# Patient Record
(demographics unavailable — no encounter records)

---

## 2024-12-03 NOTE — PHYSICAL EXAM
[Scleral Icterus] : No Scleral Icterus [Hepatojugular Reflux] : patient did not have a sustained hepatojugular reflux [Spider Angioma] : No spider angioma(s) were observed [Abdominal Bruit] : no abdominal bruit [Abdominal  Ascites] : no ascites [Ascites Fluid Wave] : no ascites fluid wave [Ascites Tense] : ascites is not tense [Ascites Shifting Dullness] : no shifting dullness of ascites [Splenomegaly] : no splenomegaly [Caput Medusae] : no caput medusae observed [Liver Palpable ___ Finger Breadths Below Costal Margin] : was not palpable below costal margin [Jaundice] : No jaundice [Palmar Erythema] : no Palmar Erythema [FreeTextEntry7] : normal [General Appearance - Alert] : alert [Sclera] : the sclera and conjunctiva were normal [Outer Ear] : the ears and nose were normal in appearance [Neck Appearance] : the appearance of the neck was normal [Apical Impulse] : the apical impulse was normal [Bowel Sounds] : normal bowel sounds [Abdomen Soft] : soft [Abdomen Tenderness] : non-tender [Abdomen Mass (___ Cm)] : no abdominal mass palpated [No CVA Tenderness] : no ~M costovertebral angle tenderness [No Spinal Tenderness] : no spinal tenderness [Abnormal Walk] : normal gait [Nail Clubbing] : no clubbing  or cyanosis of the fingernails [Musculoskeletal - Swelling] : no joint swelling seen [Motor Tone] : muscle strength and tone were normal [Skin Color & Pigmentation] : normal skin color and pigmentation [Skin Turgor] : normal skin turgor [] : no rash [Deep Tendon Reflexes (DTR)] : deep tendon reflexes were 2+ and symmetric [Sensation] : the sensory exam was normal to light touch and pinprick [No Focal Deficits] : no focal deficits [Oriented To Time, Place, And Person] : oriented to person, place, and time [Impaired Insight] : insight and judgment were intact [Affect] : the affect was normal

## 2024-12-03 NOTE — ASSESSMENT
[FreeTextEntry1] : 37 yo woman with h/o Crohns disease now on biosimilar infliximab.  She had transient abnormalities of the liver enzymes in 04/24 with subsequent normal studies.  No h/o expsure to alc, herbal remedies, infectious liver disease.   she is overweight however Fibroscan exam today shows no steatosis and no real fibrosis. Will repeat liver encymes, screen for sundry liver disease and order sonogram to eval GB polyp seen on previous study Patient has been instructed in low carbohydrate diet for weight loss. return in a month to review findings

## 2024-12-03 NOTE — HISTORY OF PRESENT ILLNESS
[FreeTextEntry1] : 36-year-old female with strIcturing ileocolonic Crohn's Disease (currently on INFLECTRA, previously failed ADA and UST, previously screen failed for HAN, stricture/ileocolic resection after SBO Dr Lyon 7/2023) who presents today for eval of abn liver enzymes.  Patient had minor elevations of ALT AST in 4/24 and had subsequent normal liver enzymes.  Patient is chronically overweight  evere since using steroids for colitis years ago.  Not diabetic.  No h/o alcohol use or any infectious hepatitis exposure. . Remote imaging studies showed normal liver but polyp in GB

## 2024-12-06 NOTE — PHYSICAL EXAM
[No Masses] : no abdominal mass palpated [Abdomen Soft] : soft [Abnormal Walk] : normal gait [No Focal Deficits] : no focal deficits [Normal] : oriented to person, place, and time [Oriented To Time, Place, And Person] : oriented to person, place, and time [Normal Affect] : the affect was normal [Normal Mood] : the mood was normal [Hearing Threshold Finger Rub Not [place]] : hearing was normal [Normal Appearance] : the appearance of the neck was normal [No Neck Mass] : no neck mass was observed [No Respiratory Distress] : no respiratory distress [de-identified] : b/l lower abdominal tenderness [de-identified] : no current rashes

## 2024-12-06 NOTE — HISTORY OF PRESENT ILLNESS
[FreeTextEntry1] : 36-year-old female with strIcturing ileocolonic Crohn's Disease (currently on INFLECTRA, previously failed ADA and UST, previously screen failed for HAN, stricture/ileocolic resection 7/2023) who presents today for routine f/u.   Since she started Inflectra, reports worsening eczema on her hands, arms and legs. Has been following with derm in [place], has tried multiple topicals with some improvement when she is using diligently. Dermatologist advised possible switching to Rinvoq for improved eczema and continued Crohns control.   Tolerating Inflectra q8 weeks. From a GI perspective, she reports worsening lower abdominal pain the past two months. She reports when she is eating poorly she has increased symptoms with headaches, frequent BMs with urgency, and lower abdominal pain. Recently made some dietary changes and her symptoms have improved. Having 2 formed BMs daily without urgency. Denies nocturnal BMs. Denies nausea, vomiting, unintentional weight loss, rectal bleeding, heartburn, dysphagia. Denies joint pain or mouth ulcers. Recently completed course of amoxicillin for root canal procedure.  Recent visit with hepatology who repeated labs and screened for sundry liver disease and order sonogram to eval GB polyp seen on previous study.  12/24 cbc, cmp unremarkable. + VERÓNICA, dsDNA 19, elevated Immunoglobulins.   PRIOR: H. pylori breath test negative.   Pt reports today that she is overall doing much better than previous since her surgery. States she can have anywhere from 0-4 BMs daily but usually 2-3. Describes stool as soft non-painful non-bloody bowel movements per day. Previously complained of esophageal/upper GI discomfort while eating, stating she could feel food and liquid moving through her esophagus while eating and feels headache right away. She gets cramping with certain foods. Celiac (-). Reports abdomen gets stiff and often feels like "intestines are working hard".  Denies joint pain and ulcers.  Had a rash which developed a few months ago and saw DERM who did biopsy which was found to be eczema. Pt curious if rash could be from inflectra. Has hemorrhoid that comes and goes, can be painful when uses the bathroom. Las infusion 4/18/24 and pt reports no issues. Labs taken reveal AST 42 and ALT 65.  After multiple medication failures, patient previously refused IFX initiation to attempt dietary modifications until hospital admission due to SBO in 6/2023, which resulted in resection of terminal ileum stricture/ileocolic resection with Dr. Lyon 7/2023. She was initiated on IFX biosimilar postoperatively and has now completed 6 infusions. She denies side effects from receiving the infusions. She denies extraintestinal manifestations. She states that since surgery, she has had significant improvement in abdominal pain and is able to tolerate most foods without difficulty. Denies nausea and vomiting and bloating.   Cscope (4/11/24): The colon was unremarkable to the level of the ileocolonic anastomosis, minimal ulceration at staple line. NeoTI did not have any inflammation. path: left colon biopsy showing increased chronic inflammatory cells.   EGD (4/11/24): normal esophagus, localized granularity in stomach, normal duodenum. path: Gastric mucosa showing focal mild chronic active inflammation, Helicobacter pylori-like microorganisms present. No intestinal metaplasia or dysplasia identified.  7/5/23 - Laparoscopic Ileocolic Resection with Dr. Lyon Pathology: Ileal and cecal resection: Moderate to severely chronic active ileitis w/ focal ulceration and intramural abscess and moderate to severe colitis at the ICV Acute and chronic serositis and serosal fibrous adhesions Unremarkable appendix 2 hyperplastic region LNs No active disease seen at resection margins Negative for dysplasia

## 2024-12-06 NOTE — HISTORY OF PRESENT ILLNESS
[FreeTextEntry1] : 36-year-old female with strIcturing ileocolonic Crohn's Disease (currently on INFLECTRA, previously failed ADA and UST, previously screen failed for HAN, stricture/ileocolic resection 7/2023) who presents today for routine f/u.   Since she started Inflectra, reports worsening eczema on her hands, arms and legs. Has been following with derm in [place], has tried multiple topicals with some improvement when she is using diligently. Dermatologist advised possible switching to Rinvoq for improved eczema and continued Crohns control.   Tolerating Inflectra q8 weeks. From a GI perspective, she reports worsening lower abdominal pain the past two months. She reports when she is eating poorly she has increased symptoms with headaches, frequent BMs with urgency, and lower abdominal pain. Recently made some dietary changes and her symptoms have improved. Having 2 formed BMs daily without urgency. Denies nocturnal BMs. Denies nausea, vomiting, unintentional weight loss, rectal bleeding, heartburn, dysphagia. Denies joint pain or mouth ulcers. Recently completed course of amoxicillin for root canal procedure.  Recent visit with hepatology who repeated labs and screened for sundry liver disease and order sonogram to eval GB polyp seen on previous study.  12/24 cbc, cmp unremarkable. + VERÓNICA, dsDNA 19, elevated Immunoglobulins.   PRIOR: H. pylori breath test negative.   Pt reports today that she is overall doing much better than previous since her surgery. States she can have anywhere from 0-4 BMs daily but usually 2-3. Describes stool as soft non-painful non-bloody bowel movements per day. Previously complained of esophageal/upper GI discomfort while eating, stating she could feel food and liquid moving through her esophagus while eating and feels headache right away. She gets cramping with certain foods. Celiac (-). Reports abdomen gets stiff and often feels like "intestines are working hard".  Denies joint pain and ulcers.  Had a rash which developed a few months ago and saw DERM who did biopsy which was found to be eczema. Pt curious if rash could be from inflectra. Has hemorrhoid that comes and goes, can be painful when uses the bathroom. Las infusion 4/18/24 and pt reports no issues. Labs taken reveal AST 42 and ALT 65.  After multiple medication failures, patient previously refused IFX initiation to attempt dietary modifications until hospital admission due to SBO in 6/2023, which resulted in resection of terminal ileum stricture/ileocolic resection with Dr. Lyon 7/2023. She was initiated on IFX biosimilar postoperatively and has now completed 6 infusions. She denies side effects from receiving the infusions. She denies extraintestinal manifestations. She states that since surgery, she has had significant improvement in abdominal pain and is able to tolerate most foods without difficulty. Denies nausea and vomiting and bloating.   Cscope (4/11/24): The colon was unremarkable to the level of the ileocolonic anastomosis, minimal ulceration at staple line. NeoTI did not have any inflammation. path: left colon biopsy showing increased chronic inflammatory cells.   EGD (4/11/24): normal esophagus, localized granularity in stomach, normal duodenum. path: Gastric mucosa showing focal mild chronic active inflammation, Helicobacter pylori-like microorganisms present. No intestinal metaplasia or dysplasia identified.  7/5/23 - Laparoscopic Ileocolic Resection with Dr. Lyon Pathology: Ileal and cecal resection: Moderate to severely chronic active ileitis w/ focal ulceration and intramural abscess and moderate to severe colitis at the ICV Acute and chronic serositis and serosal fibrous adhesions Unremarkable appendix 2 hyperplastic region LNs No active disease seen at resection margins Negative for dysplasia

## 2024-12-06 NOTE — PHYSICAL EXAM
Hematology called and stated that they had tried to reach out to patient to schedule for referral. Could not get a hold of her.    Please advise with callback @ [email or web address]    Thank you,  Paco Pollock, PCT     [No Masses] : no abdominal mass palpated [Abdomen Soft] : soft [Abnormal Walk] : normal gait [No Focal Deficits] : no focal deficits [Normal] : oriented to person, place, and time [Oriented To Time, Place, And Person] : oriented to person, place, and time [Normal Affect] : the affect was normal [Normal Mood] : the mood was normal [Hearing Threshold Finger Rub Not [place]] : hearing was normal [Normal Appearance] : the appearance of the neck was normal [No Neck Mass] : no neck mass was observed [No Respiratory Distress] : no respiratory distress [de-identified] : b/l lower abdominal tenderness [de-identified] : no current rashes

## 2024-12-06 NOTE — ASSESSMENT
[FreeTextEntry1] : 36-year-old female with stricturing ileocolonic Crohn's Disease (currently on INFLECTRA, previously failed ADA and UST, previously screen failed for HAN, stricture/ileocolic resection 7/2023) who presents today for follow up.   Stricturing ileocolonic Crohn's disease Patient currently with increased lower abdominal pain over the past two months, pain, HAs, and diarrhea are exacerbated by poor diet. Also with worsening eczema since starting IFX, following with derm who advised switching biologic to Rinvoq. Given increased GI symptoms, worsening eczema, elevated dsDNA (potentially indicative of decreased response) will plan to evaluate further with colonoscopy and change advanced therapies  - 4/24 cscope s/p ileocolic resection (7/5/23) revealed minimal ulceration at staple line but otherwise NeoTI and colon to level of ileocolonic anastamosis without inflammation.  - 12/24 cbc, cmp unremarkable - +VERÓNICA, dsDNA 19, elevated Immunoglobulins (ordered by hep) - plan for cscope at [facility] with miralax/mag prep. discussed r/ai/b - D/C Inflectra infusions  - unable to check IFX levels and Ab today as patient's most recent infusion was this morning - order fecal calpro  - pt reports no immediate plans for family planning  - Discussed risks vs benefits of Rinvoq and Skyrizi (both would have dual benefit for dermatologic concerns), patient will let our office know which she would like to initiate  - pt reports h/o LE edema without thrombus; has seen heme and vascular, did not require anticoagulation  - The risks and special considerations of the JAK Inhibitor medications were discussed with the patient. This includes common side effects (headache, upper respiratory infection, nasopharyngitis, acne, urinary tract infection, and elevated cholesterol), increased risk of serious infections (including TB and herpes zoster), increased risk of blood clots (DVT and PE), and cardiovascular events in patients over the age of 50 with at least 1 heart disease risk factor (especially tobacco use). To mitigate these risks, baseline CBC, CMP, lipid panel, Hepatitis B, and QuantiFERON TB studies will be obtained prior to initial medication administration and periodic labs for medication monitoring. We recommend appropriate immunization (influenza, pneumonia, respiratory syncytial virus, herpes zoster, hepatitis B). Patient instructed to avoid live vaccinations while on small molecule therapy and for 4 weeks after discontinuation. Patient understands this medication may be teratogenic and that contraception or abstinence is required while on a MIRIAM inhibitor. Patient understands to notify the IBD team of pregnancy or upcoming major surgical operations, as this may require medication adjustment and collaboration between treating physicians. Patient given instructions for obtaining detailed resources on the medication through the drug manufacturers website as well as through the [employer]hns [employer] - The risks and special considerations of the Interleukin-12/23 and Interleukin 23 Antagonist medications were discussed with the patient. This includes local side effects and intolerances at the medication administration site (redness, itching, bruising, pain, or swelling), systemic side effects (headache, fever, chills, hives, rashes, infusion reactions, or anaphylaxis), increased risk of infections (bacterial, fungal, and viral), and liver injury. To mitigate these risks, baseline CBC, CMP, Hepatitis B, and QuantiFERON TB studies will be obtained prior to initial medication administration and periodic labs for medication monitoring. We recommend appropriate immunization (influenza, pneumonia, respiratory syncytial virus, herpes zoster, hepatitis B). Patient instructed to avoid live vaccinations while on biologic therapy and for 4 weeks after discontinuation. Patient understands to notify the IBD team of pregnancy or upcoming major surgical operations, as this may require medication adjustment and collaboration between treating physicians. Patient given instructions for obtaining detailed resources on the medication through the drug manufacturers website as well as through the [employer]hns [employer].  H. pylori infection, resolved  - pt completed 12/14 days of quadruple therapy May 2024  - h. pylori eradication testing negative   Headaches related to food, abdominal cramping  - pt know how to avoid this with diet  - reports some hx of migraines, consider seeing migraine specialist  - previously recommended to follow up with [facility] for nutrition guidance and to evaluate if any foods could be triggering headaches  - takes magnesium at bed time as recommended to her in the past, can continue   Mild AST/ALT elevation, resolved - Recent visit with hepatology who repeated labs (LFTs normalized) and screened for sundry liver disease and order sonogram to eval GB polyp seen on previous study.  Eczema rash - prior biopsy confirmed eczema diagnosis  - following with DERM in [place], who advised possible switching IFX to Rinvoq  - has tried multiple topicals with some improvement in rash on arms and legs when she is using diligently.   HCM - refusing annual flu vaccine - UTD annual dermatology   f/u post colonoscopy

## 2024-12-06 NOTE — HISTORY OF PRESENT ILLNESS
[FreeTextEntry1] : 36-year-old female with strIcturing ileocolonic Crohn's Disease (currently on INFLECTRA, previously failed ADA and UST, previously screen failed for HAN, stricture/ileocolic resection 7/2023) who presents today for routine f/u.   Since she started Inflectra, reports worsening eczema on her hands, arms and legs. Has been following with derm in [place], has tried multiple topicals with some improvement when she is using diligently. Dermatologist advised possible switching to Rinvoq for improved eczema and continued Crohns control.   Tolerating Inflectra q8 weeks. From a GI perspective, she reports worsening lower abdominal pain the past two months. She reports when she is eating poorly she has increased symptoms with headaches, frequent BMs with urgency, and lower abdominal pain. Recently made some dietary changes and her symptoms have improved. Having 2 formed BMs daily without urgency. Denies nocturnal BMs. Denies nausea, vomiting, unintentional weight loss, rectal bleeding, heartburn, dysphagia. Denies joint pain or mouth ulcers. Recently completed course of amoxicillin for root canal procedure.  Recent visit with hepatology who repeated labs and screened for sundry liver disease and order sonogram to eval GB polyp seen on previous study.  12/24 cbc, cmp unremarkable. + VERÓNICA, dsDNA 19, elevated Immunoglobulins.   PRIOR: H. pylori breath test negative.   Pt reports today that she is overall doing much better than previous since her surgery. States she can have anywhere from 0-4 BMs daily but usually 2-3. Describes stool as soft non-painful non-bloody bowel movements per day. Previously complained of esophageal/upper GI discomfort while eating, stating she could feel food and liquid moving through her esophagus while eating and feels headache right away. She gets cramping with certain foods. Celiac (-). Reports abdomen gets stiff and often feels like "intestines are working hard".  Denies joint pain and ulcers.  Had a rash which developed a few months ago and saw DERM who did biopsy which was found to be eczema. Pt curious if rash could be from inflectra. Has hemorrhoid that comes and goes, can be painful when uses the bathroom. Las infusion 4/18/24 and pt reports no issues. Labs taken reveal AST 42 and ALT 65.  After multiple medication failures, patient previously refused IFX initiation to attempt dietary modifications until hospital admission due to SBO in 6/2023, which resulted in resection of terminal ileum stricture/ileocolic resection with Dr. Lyon 7/2023. She was initiated on IFX biosimilar postoperatively and has now completed 6 infusions. She denies side effects from receiving the infusions. She denies extraintestinal manifestations. She states that since surgery, she has had significant improvement in abdominal pain and is able to tolerate most foods without difficulty. Denies nausea and vomiting and bloating.   Cscope (4/11/24): The colon was unremarkable to the level of the ileocolonic anastomosis, minimal ulceration at staple line. NeoTI did not have any inflammation. path: left colon biopsy showing increased chronic inflammatory cells.   EGD (4/11/24): normal esophagus, localized granularity in stomach, normal duodenum. path: Gastric mucosa showing focal mild chronic active inflammation, Helicobacter pylori-like microorganisms present. No intestinal metaplasia or dysplasia identified.  7/5/23 - Laparoscopic Ileocolic Resection with Dr. Lyon Pathology: Ileal and cecal resection: Moderate to severely chronic active ileitis w/ focal ulceration and intramural abscess and moderate to severe colitis at the ICV Acute and chronic serositis and serosal fibrous adhesions Unremarkable appendix 2 hyperplastic region LNs No active disease seen at resection margins Negative for dysplasia

## 2024-12-06 NOTE — ASSESSMENT
[FreeTextEntry1] : 36-year-old female with stricturing ileocolonic Crohn's Disease (currently on INFLECTRA, previously failed ADA and UST, previously screen failed for HAN, stricture/ileocolic resection 7/2023) who presents today for follow up.   Stricturing ileocolonic Crohn's disease Patient currently with increased lower abdominal pain over the past two months, pain, HAs, and diarrhea are exacerbated by poor diet. Also with worsening eczema since starting IFX, following with derm who advised switching biologic to Rinvoq. Given increased GI symptoms, worsening eczema, elevated dsDNA (potentially indicative of decreased response) will plan to evaluate further with colonoscopy and change advanced therapies  - 4/24 cscope s/p ileocolic resection (7/5/23) revealed minimal ulceration at staple line but otherwise NeoTI and colon to level of ileocolonic anastamosis without inflammation.  - 12/24 cbc, cmp unremarkable - +VERÓNICA, dsDNA 19, elevated Immunoglobulins (ordered by hep) - plan for cscope at [facility] with miralax/mag prep. discussed r/ai/b - D/C Inflectra infusions  - unable to check IFX levels and Ab today as patient's most recent infusion was this morning - order fecal calpro  - pt reports no immediate plans for family planning  - Discussed risks vs benefits of Rinvoq and Skyrizi (both would have dual benefit for dermatologic concerns), patient will let our office know which she would like to initiate  - pt reports h/o LE edema without thrombus; has seen heme and vascular, did not require anticoagulation  - The risks and special considerations of the JAK Inhibitor medications were discussed with the patient. This includes common side effects (headache, upper respiratory infection, nasopharyngitis, acne, urinary tract infection, and elevated cholesterol), increased risk of serious infections (including TB and herpes zoster), increased risk of blood clots (DVT and PE), and cardiovascular events in patients over the age of 50 with at least 1 heart disease risk factor (especially tobacco use). To mitigate these risks, baseline CBC, CMP, lipid panel, Hepatitis B, and QuantiFERON TB studies will be obtained prior to initial medication administration and periodic labs for medication monitoring. We recommend appropriate immunization (influenza, pneumonia, respiratory syncytial virus, herpes zoster, hepatitis B). Patient instructed to avoid live vaccinations while on small molecule therapy and for 4 weeks after discontinuation. Patient understands this medication may be teratogenic and that contraception or abstinence is required while on a MIRIAM inhibitor. Patient understands to notify the IBD team of pregnancy or upcoming major surgical operations, as this may require medication adjustment and collaboration between treating physicians. Patient given instructions for obtaining detailed resources on the medication through the drug manufacturers website as well as through the [employer] - The risks and special considerations of the Interleukin-12/23 and Interleukin 23 Antagonist medications were discussed with the patient. This includes local side effects and intolerances at the medication administration site (redness, itching, bruising, pain, or swelling), systemic side effects (headache, fever, chills, hives, rashes, infusion reactions, or anaphylaxis), increased risk of infections (bacterial, fungal, and viral), and liver injury. To mitigate these risks, baseline CBC, CMP, Hepatitis B, and QuantiFERON TB studies will be obtained prior to initial medication administration and periodic labs for medication monitoring. We recommend appropriate immunization (influenza, pneumonia, respiratory syncytial virus, herpes zoster, hepatitis B). Patient instructed to avoid live vaccinations while on biologic therapy and for 4 weeks after discontinuation. Patient understands to notify the IBD team of pregnancy or upcoming major surgical operations, as this may require medication adjustment and collaboration between treating physicians. Patient given instructions for obtaining detailed resources on the medication through the drug manufacturers website as well as through the [employer]hns [employer].  H. pylori infection, resolved  - pt completed 12/14 days of quadruple therapy May 2024  - h. pylori eradication testing negative   Headaches related to food, abdominal cramping  - pt know how to avoid this with diet  - reports some hx of migraines, consider seeing migraine specialist  - previously recommended to follow up with [facility] for nutrition guidance and to evaluate if any foods could be triggering headaches  - takes magnesium at bed time as recommended to her in the past, can continue   Mild AST/ALT elevation, resolved - Recent visit with hepatology who repeated labs (LFTs normalized) and screened for sundry liver disease and order sonogram to eval GB polyp seen on previous study.  Eczema rash - prior biopsy confirmed eczema diagnosis  - following with DERM in [place], who advised possible switching IFX to Rinvoq  - has tried multiple topicals with some improvement in rash on arms and legs when she is using diligently.   HCM - refusing annual flu vaccine - UTD annual dermatology   f/u post colonoscopy

## 2024-12-06 NOTE — ASSESSMENT
[FreeTextEntry1] : 36-year-old female with stricturing ileocolonic Crohn's Disease (currently on INFLECTRA, previously failed ADA and UST, previously screen failed for HAN, stricture/ileocolic resection 7/2023) who presents today for follow up.   Stricturing ileocolonic Crohn's disease Patient currently with increased lower abdominal pain over the past two months, pain, HAs, and diarrhea are exacerbated by poor diet. Also with worsening eczema since starting IFX, following with derm who advised switching biologic to Rinvoq. Given increased GI symptoms, worsening eczema, elevated dsDNA (potentially indicative of decreased response) will plan to evaluate further with colonoscopy and change advanced therapies  - 4/24 cscope s/p ileocolic resection (7/5/23) revealed minimal ulceration at staple line but otherwise NeoTI and colon to level of ileocolonic anastamosis without inflammation.  - 12/24 cbc, cmp unremarkable - +VERÓNICA, dsDNA 19, elevated Immunoglobulins (ordered by hep) - plan for cscope at [facility] with miralax/mag prep. discussed r/ai/b - D/C Inflectra infusions  - unable to check IFX levels and Ab today as patient's most recent infusion was this morning - order fecal calpro  - pt reports no immediate plans for family planning  - Discussed risks vs benefits of Rinvoq and Skyrizi (both would have dual benefit for dermatologic concerns), patient will let our office know which she would like to initiate  - pt reports h/o LE edema without thrombus; has seen heme and vascular, did not require anticoagulation  - The risks and special considerations of the JAK Inhibitor medications were discussed with the patient. This includes common side effects (headache, upper respiratory infection, nasopharyngitis, acne, urinary tract infection, and elevated cholesterol), increased risk of serious infections (including TB and herpes zoster), increased risk of blood clots (DVT and PE), and cardiovascular events in patients over the age of 50 with at least 1 heart disease risk factor (especially tobacco use). To mitigate these risks, baseline CBC, CMP, lipid panel, Hepatitis B, and QuantiFERON TB studies will be obtained prior to initial medication administration and periodic labs for medication monitoring. We recommend appropriate immunization (influenza, pneumonia, respiratory syncytial virus, herpes zoster, hepatitis B). Patient instructed to avoid live vaccinations while on small molecule therapy and for 4 weeks after discontinuation. Patient understands this medication may be teratogenic and that contraception or abstinence is required while on a MIRIAM inhibitor. Patient understands to notify the IBD team of pregnancy or upcoming major surgical operations, as this may require medication adjustment and collaboration between treating physicians. Patient given instructions for obtaining detailed resources on the medication through the drug manufacturers website as well as through the [employer]hns [employer] Colitis [employer] - The risks and special considerations of the Interleukin-12/23 and Interleukin 23 Antagonist medications were discussed with the patient. This includes local side effects and intolerances at the medication administration site (redness, itching, bruising, pain, or swelling), systemic side effects (headache, fever, chills, hives, rashes, infusion reactions, or anaphylaxis), increased risk of infections (bacterial, fungal, and viral), and liver injury. To mitigate these risks, baseline CBC, CMP, Hepatitis B, and QuantiFERON TB studies will be obtained prior to initial medication administration and periodic labs for medication monitoring. We recommend appropriate immunization (influenza, pneumonia, respiratory syncytial virus, herpes zoster, hepatitis B). Patient instructed to avoid live vaccinations while on biologic therapy and for 4 weeks after discontinuation. Patient understands to notify the IBD team of pregnancy or upcoming major surgical operations, as this may require medication adjustment and collaboration between treating physicians. Patient given instructions for obtaining detailed resources on the medication through the drug manufacturers website as well as through the [employer]hns [employer] Colitis [employer].  H. pylori infection, resolved  - pt completed 12/14 days of quadruple therapy May 2024  - h. pylori eradication testing negative   Headaches related to food, abdominal cramping  - pt know how to avoid this with diet  - reports some hx of migraines, consider seeing migraine specialist  - previously recommended to follow up with [facility] for nutrition guidance and to evaluate if any foods could be triggering headaches  - takes magnesium at bed time as recommended to her in the past, can continue   Mild AST/ALT elevation, resolved - Recent visit with hepatology who repeated labs (LFTs normalized) and screened for sundry liver disease and order sonogram to eval GB polyp seen on previous study.  Eczema rash - prior biopsy confirmed eczema diagnosis  - following with DERM in [place], who advised possible switching IFX to Rinvoq  - has tried multiple topicals with some improvement in rash on arms and legs when she is using diligently.   HCM - refusing annual flu vaccine - UTD annual dermatology   f/u post colonoscopy

## 2025-01-11 NOTE — HISTORY OF PRESENT ILLNESS
[FreeTextEntry1] : 36-year-old female with strIcturing ileocolonic Crohn's Disease (currently on INFLECTRA, previously failed ADA and UST, previously screen failed for HAN, stricture/ileocolic resection 7/2023) -- currently in endoscopic remission 12/2024, eczema proven by biopsy who presents today for routine f/u.   1/8/2025 Interval Hx: Clinic Visit Pt was last seen 12/5/2024, was complaining of lower abd pain for the past 2 months and headache and stool frequency. She also complained of worsening eczema. She went for colonoscopy on 12/10- colonic mucosa was normal appearing, left sided colon biopsies were unremarkable. Fecal calprotectin (12/9/2024) was 49. She has some lower abd discomfort when she has greasy foods.   Last inflectra dose was 12/5. Since she started Inflectra, pt reports worsening eczema on her hands, arms and legs, behind ears. She has biopsy proven eczema, has been using creams without improvement. Many times during the week, her eczema patches on shins ooze. Dermatologist advised possible switching to Rinvoq to help with eczema/Crohn's simultaneously. She is  now, has 4 kids at home. No plans for more kids at this time  Labs 12/10/2024:Fecal calpro 49, Liver enzymes WNL Colonoscopy 12/10/2024 : Normal mucosa in the karyna terminal ileum consistent with i0 disease. Side to side ileocolonic anastomosis. Grade/Stage I internal hemorrhoids.The colon was otherwise unremarkable, indicating endoscopic remission of Crohn's disease, which is the same as previous colonoscopy 4/2024. This is c/w efficacy of this MOA.  Left colon; biopsy: -   Colonic mucosa without significant histopathologic findings.   PRIOR: H. pylori breath test negative.   Pt reports today that she is overall doing much better than previous since her surgery. States she can have anywhere from 0-4 BMs daily but usually 2-3. Describes stool as soft non-painful non-bloody bowel movements per day. Previously complained of esophageal/upper GI discomfort while eating, stating she could feel food and liquid moving through her esophagus while eating and feels headache right away. She gets cramping with certain foods. Celiac (-). Reports abdomen gets stiff and often feels like "intestines are working hard".  Denies joint pain and ulcers.  Had a rash which developed a few months ago and saw DERM who did biopsy which was found to be eczema. Pt curious if rash could be from inflectra. Has hemorrhoid that comes and goes, can be painful when uses the bathroom. Las infusion 4/18/24 and pt reports no issues. Labs taken reveal AST 42 and ALT 65.  After multiple medication failures, patient previously refused IFX initiation to attempt dietary modifications until hospital admission due to SBO in 6/2023, which resulted in resection of terminal ileum stricture/ileocolic resection with Dr. Lyon 7/2023. She was initiated on IFX biosimilar postoperatively and has now completed 6 infusions. She denies side effects from receiving the infusions. She denies extraintestinal manifestations. She states that since surgery, she has had significant improvement in abdominal pain and is able to tolerate most foods without difficulty. Denies nausea and vomiting and bloating.   Cscope (4/11/24): The colon was unremarkable to the level of the ileocolonic anastomosis, minimal ulceration at staple line. NeoTI did not have any inflammation. path: left colon biopsy showing increased chronic inflammatory cells.   EGD (4/11/24): normal esophagus, localized granularity in stomach, normal duodenum. path: Gastric mucosa showing focal mild chronic active inflammation, Helicobacter pylori-like microorganisms present. No intestinal metaplasia or dysplasia identified.  7/5/23 - Laparoscopic Ileocolic Resection with Dr. Lyon Pathology: Ileal and cecal resection: Moderate to severely chronic active ileitis w/ focal ulceration and intramural abscess and moderate to severe colitis at the ICV Acute and chronic serositis and serosal fibrous adhesions Unremarkable appendix 2 hyperplastic region LNs No active disease seen at resection margins Negative for dysplasia

## 2025-01-11 NOTE — PHYSICAL EXAM
[Hearing Threshold Finger Rub Not [place]] : hearing was normal [Normal Appearance] : the appearance of the neck was normal [No Neck Mass] : no neck mass was observed [No Respiratory Distress] : no respiratory distress [No Masses] : no abdominal mass palpated [Abdomen Soft] : soft [Abnormal Walk] : normal gait [No Focal Deficits] : no focal deficits [Normal] : oriented to person, place, and time [Oriented To Time, Place, And Person] : oriented to person, place, and time [Normal Affect] : the affect was normal [Normal Mood] : the mood was normal [de-identified] : b/l lower abdominal tenderness [de-identified] : scattered oval plaques consisting of small raised spots and skin scaling over bilateral shins and calves, erythematous peeling skin on palms with cracks

## 2025-01-11 NOTE — PHYSICAL EXAM
[Hearing Threshold Finger Rub Not [place]] : hearing was normal [Normal Appearance] : the appearance of the neck was normal [No Neck Mass] : no neck mass was observed [No Respiratory Distress] : no respiratory distress [No Masses] : no abdominal mass palpated [Abdomen Soft] : soft [Abnormal Walk] : normal gait [No Focal Deficits] : no focal deficits [Normal] : oriented to person, place, and time [Oriented To Time, Place, And Person] : oriented to person, place, and time [Normal Affect] : the affect was normal [Normal Mood] : the mood was normal [de-identified] : b/l lower abdominal tenderness [de-identified] : scattered oval plaques consisting of small raised spots and skin scaling over bilateral shins and calves, erythematous peeling skin on palms with cracks

## 2025-01-11 NOTE — PHYSICAL EXAM
[Hearing Threshold Finger Rub Not [place]] : hearing was normal [Normal Appearance] : the appearance of the neck was normal [No Neck Mass] : no neck mass was observed [No Respiratory Distress] : no respiratory distress [No Masses] : no abdominal mass palpated [Abdomen Soft] : soft [Abnormal Walk] : normal gait [No Focal Deficits] : no focal deficits [Normal] : oriented to person, place, and time [Oriented To Time, Place, And Person] : oriented to person, place, and time [Normal Affect] : the affect was normal [Normal Mood] : the mood was normal [de-identified] : b/l lower abdominal tenderness [de-identified] : scattered oval plaques consisting of small raised spots and skin scaling over bilateral shins and calves, erythematous peeling skin on palms with cracks

## 2025-01-11 NOTE — ASSESSMENT
[FreeTextEntry1] : 36-year-old female with strIcturing ileocolonic Crohn's Disease (currently on INFLECTRA, previously failed ADA and UST, previously screen failed for HAN, stricture/ileocolic resection 7/2023) -- currently in endoscopic remission 12/2024, eczema proven by biopsy who presents today for routine f/u.    #Stricturing ileocolonic Crohn's disease - in endoscopic remission 12/10/2024 colon, on Inflectra however, pt not tolerating due to poorly controlled eczema that started around the time Inflectra started. This has significantly impacted her day to day life. Last dose Inflectra 12/5- since held.   -We had an extensive discussion regarding risk benefit of Skyrizi vs. Rinvoq- including the increased risk of DVT, PE, shingles, and CAD in Rinvoq pts. She has no plans for further pregnancies. Pt is interested in proceeding with Rinvoq, and she prefers this because it is PO. The risks and special considerations of the JAK Inhibitor medications were discussed with the patient. This includes common side effects (headache, upper respiratory infection, nasopharyngitis, acne, urinary tract infection, and elevated cholesterol), increased risk of serious infections (including TB and herpes zoster), increased risk of blood clots (DVT and PE), and cardiovascular events in patients over the age of 50 with at least 1 heart disease risk factor (especially tobacco use). To mitigate these risks, baseline CBC, CMP, lipid panel, Hepatitis B, and QuantiFERON TB studies will be obtained prior to initial medication administration and periodic labs for medication monitoring. We recommend appropriate immunization (influenza, pneumonia, respiratory syncytial virus, herpes zoster, hepatitis B). Patient instructed to avoid live vaccinations while on small molecule therapy and for 4 weeks after discontinuation. Patient understands this medication may be teratogenic and that contraception or abstinence is required while on a MIRIAM inhibitor. Patient understands to notify the IBD team of pregnancy or upcoming major surgical operations, as this may require medication adjustment and collaboration between treating physicians. Patient given instructions for obtaining detailed resources on the medication through the drug manufacturers website as well as through the Crohns [employer] Colitis [employer] - continue to hold inflectra, last dose was 12/5 - plan for 8 week wash out period, and to start Rinvoq first week of Feb 2025 - RTC 4 weeks after starting Rinvoq - pt received shingrix vaccine today in clinic    HCM - refused annual flu vaccine - UTD annual dermatology   f/u 4 weeks after starting rinvoq or earlier, if symptoms arise

## 2025-01-22 NOTE — ASSESSMENT
[FreeTextEntry1] : 36 y.o overweight woman with inflam bowel disease on biologic medicines who has essentially S-2 steatosis of liver. Liver problem is not secondary to biologic med, is not PSC, but appears to be 2/2 obesity & overweight status Eval for other cause of abn liver enzymes: negative Enzymes  have recently been in normal phase. Patient advised re nutritional control and need for low carbohydrate control diet with weight loss also increased aerobic exercise Patient wishes to manage her own nutritional control status Will return in two to three months to eval progress

## 2025-01-22 NOTE — HISTORY OF PRESENT ILLNESS
[FreeTextEntry1] : 36-year-old female with strIcturing ileocolonic Crohn's Disease (currently on INFLECTRA, previously failed ADA and UST, previously screen failed for HAN, stricture/ileocolic resection after SBO Dr Lyon 7/2023) who presents today for eval of abn liver enzymes. Patient had minor elevations of ALT AST in 4/24 and had subsequent normal liver enzymes. Patient is chronically overweight evere since using steroids for colitis years ago. Not diabetic. No h/o alcohol use or any infectious hepatitis exposure. . Eval for non steatotic liver disease wasnegatiave Remote imaging studies showed normal liver but has   polyps in GB patient is 64 inches tall and weighs 190 lbs=clearly significantly overweight Full blood work did not reveal any intrinsic liver disease liver enzymes were recently WNL US abdominal= Normal in size and echogenicity. No focal lesions are identified. Bile ducts: Normal caliber. Common bile duct measures 5 mm. No significant change 6 mm and 5 mm gallbladder polyps. No gallstones. No wall thickening or pericholecystic fluid. Negative sonographic Choudhury's sign.  Fibroscan today 4.7kPa 258CAP

## 2025-01-22 NOTE — PHYSICAL EXAM
[Non-Tender] : non-tender [Cognitive Mini-Mental Status Normal?] : Cognitive Mini Mental Status Exam is normal [General Appearance - Alert] : alert [General Appearance - In No Acute Distress] : in no acute distress [Sclera] : the sclera and conjunctiva were normal [PERRL With Normal Accommodation] : pupils were equal in size, round, and reactive to light [Extraocular Movements] : extraocular movements were intact [Outer Ear] : the ears and nose were normal in appearance [Oropharynx] : the oropharynx was normal [Auscultation Breath Sounds / Voice Sounds] : lungs were clear to auscultation bilaterally [Heart Rate And Rhythm] : heart rate was normal and rhythm regular [Heart Sounds] : normal S1 and S2 [Heart Sounds Gallop] : no gallops [Murmurs] : no murmurs [Heart Sounds Pericardial Friction Rub] : no pericardial rub [Full Pulse] : the pedal pulses are present [Edema] : there was no peripheral edema [Bowel Sounds] : normal bowel sounds [Abdomen Soft] : soft [Abdomen Tenderness] : non-tender [Abdomen Mass (___ Cm)] : no abdominal mass palpated [No CVA Tenderness] : no ~M costovertebral angle tenderness [No Spinal Tenderness] : no spinal tenderness [Abnormal Walk] : normal gait [Nail Clubbing] : no clubbing  or cyanosis of the fingernails [Musculoskeletal - Swelling] : no joint swelling seen [Motor Tone] : muscle strength and tone were normal [Skin Color & Pigmentation] : normal skin color and pigmentation [Skin Turgor] : normal skin turgor [] : no rash [Scleral Icterus] : No Scleral Icterus [Hepatojugular Reflux] : patient did not have a sustained hepatojugular reflux [Spider Angioma] : No spider angioma(s) were observed [Abdominal Bruit] : no abdominal bruit [Abdominal  Ascites] : no ascites [Ascites Fluid Wave] : no ascites fluid wave [Ascites Tense] : ascites is not tense [Ascites Shifting Dullness] : no shifting dullness of ascites [Splenomegaly] : no splenomegaly [Caput Medusae] : no caput medusae observed [Liver Palpable ___ Finger Breadths Below Costal Margin] : was not palpable below costal margin [Scrotal Edema] : Scrotum is not edematous [Asterixis] : no asterixis observed [Jaundice] : No jaundice [Palmar Erythema] : no Palmar Erythema [Depression] : no depression [Hallucinations] : ~T no ~M hallucinations [Delusions] : no ~T delusions [Suicidal Ideation] : no suicidal ideation [Homicidal Ideation] : no homicidal ideations [Preoccupiation With Violent Thoughts] : no preoccupation with violent thoughts

## 2025-03-22 NOTE — PHYSICAL EXAM
[Hearing Threshold Finger Rub Not [place]] : hearing was normal [Normal Appearance] : the appearance of the neck was normal [No Neck Mass] : no neck mass was observed [No Respiratory Distress] : no respiratory distress [No Masses] : no abdominal mass palpated [Abdomen Soft] : soft [Abnormal Walk] : normal gait [No Focal Deficits] : no focal deficits [Normal] : oriented to person, place, and time [Oriented To Time, Place, And Person] : oriented to person, place, and time [Normal Affect] : the affect was normal [Normal Mood] : the mood was normal [de-identified] : epigastric hernia felt on exertion of abdominal muscles  [de-identified] : scattered oval plaques consisting of small raised spots and skin scaling over bilateral shins and calves, erythematous peeling skin on palms with cracks

## 2025-03-22 NOTE — HISTORY OF PRESENT ILLNESS
[FreeTextEntry1] : 36-year-old female with strIcturing ileocolonic Crohn's Disease (currently on INFLECTRA, previously failed ADA and UST, previously screen failed for HAN, stricture/ileocolic resection 7/2023) -- currently in endoscopic remission 12/2024, eczema proven by biopsy worsened on IFX, now on Upadicitinib with resolution of eczema, stable clinical symptoms.  3/19/25: Pt reports she's very relieved her eczema has resolved. Denies worsening of IBD symptoms. Does report new onset epigastric abdominal pain where she feels a tightening and a bulge in her mid abdomen. No nausea/vomiting and no change in bowel habits. No fevers/chills. Unrelated to food or timing of day.   1/8/2025 Interval Hx: Clinic Visit Pt was last seen 12/5/2024, was complaining of lower abd pain for the past 2 months and headache and stool frequency. She also complained of worsening eczema. She went for colonoscopy on 12/10- colonic mucosa was normal appearing, left sided colon biopsies were unremarkable. Fecal calprotectin (12/9/2024) was 49. She has some lower abd discomfort when she has greasy foods.   Last inflectra dose was 12/5. Since she started Inflectra, pt reports worsening eczema on her hands, arms and legs, behind ears. She has biopsy proven eczema, has been using creams without improvement. Many times during the week, her eczema patches on shins ooze. Dermatologist advised possible switching to Rinvoq to help with eczema/Crohn's simultaneously. She is  now, has 4 kids at home. No plans for more kids at this time  Labs 12/10/2024:Fecal calpro 49, Liver enzymes WNL Colonoscopy 12/10/2024 : Normal mucosa in the karyna terminal ileum consistent with i0 disease. Side to side ileocolonic anastomosis. Grade/Stage I internal hemorrhoids.The colon was otherwise unremarkable, indicating endoscopic remission of Crohn's disease, which is the same as previous colonoscopy 4/2024. This is c/w efficacy of this MOA.  Left colon; biopsy: -   Colonic mucosa without significant histopathologic findings.   PRIOR: H. pylori breath test negative.   Pt reports today that she is overall doing much better than previous since her surgery. States she can have anywhere from 0-4 BMs daily but usually 2-3. Describes stool as soft non-painful non-bloody bowel movements per day. Previously complained of esophageal/upper GI discomfort while eating, stating she could feel food and liquid moving through her esophagus while eating and feels headache right away. She gets cramping with certain foods. Celiac (-). Reports abdomen gets stiff and often feels like "intestines are working hard".  Denies joint pain and ulcers.  Had a rash which developed a few months ago and saw DERM who did biopsy which was found to be eczema. Pt curious if rash could be from inflectra. Has hemorrhoid that comes and goes, can be painful when uses the bathroom. Las infusion 4/18/24 and pt reports no issues. Labs taken reveal AST 42 and ALT 65.  After multiple medication failures, patient previously refused IFX initiation to attempt dietary modifications until hospital admission due to SBO in 6/2023, which resulted in resection of terminal ileum stricture/ileocolic resection with Dr. Lyon 7/2023. She was initiated on IFX biosimilar postoperatively and has now completed 6 infusions. She denies side effects from receiving the infusions. She denies extraintestinal manifestations. She states that since surgery, she has had significant improvement in abdominal pain and is able to tolerate most foods without difficulty. Denies nausea and vomiting and bloating.   Cscope (4/11/24): The colon was unremarkable to the level of the ileocolonic anastomosis, minimal ulceration at staple line. NeoTI did not have any inflammation. path: left colon biopsy showing increased chronic inflammatory cells.   EGD (4/11/24): normal esophagus, localized granularity in stomach, normal duodenum. path: Gastric mucosa showing focal mild chronic active inflammation, Helicobacter pylori-like microorganisms present. No intestinal metaplasia or dysplasia identified.  7/5/23 - Laparoscopic Ileocolic Resection with Dr. Lyon Pathology: Ileal and cecal resection: Moderate to severely chronic active ileitis w/ focal ulceration and intramural abscess and moderate to severe colitis at the ICV Acute and chronic serositis and serosal fibrous adhesions Unremarkable appendix 2 hyperplastic region LNs No active disease seen at resection margins Negative for dysplasia

## 2025-03-22 NOTE — ASSESSMENT
[FreeTextEntry1] : 36-year-old female with strIcturing ileocolonic Crohn's Disease (currently on INFLECTRA, previously failed ADA and UST, previously screen failed for HAN, stricture/ileocolic resection 7/2023) -- currently in endoscopic remission 12/2024, eczema proven by biopsy worsened on IFX, now on Upadicitinib with resolution of eczema, stable clinical symptoms.  #New onset abdominal pain with bulge - c/w Incisional hernia  - on physical exam, hernia present when patient performs sit-up - Dr. Lebron present and consulted on patient; advised she undergo CT without contrast to identify hernia and plan for surgery - pt will f/u Dr. Lebron post-CT scan   #Stricturing ileocolonic Crohn's disease - in endoscopic remission 12/10/2024 colon, previously on Inflectra however, pt was transitioned to UPA due to poorly controlled eczema that started around the time Inflectra started. This has significantly impacted her day to day life. Last dose Inflectra 12/5- since stopped with resolution of eczema - pt tolerating UPA well  - labs today including lipid panel  - extensive discussion regarding risk benefit of Skyrizi vs. Rinvoq at last visit - including the increased risk of DVT, PE, shingles, and CAD in Rinvoq pts. She has no plans for further pregnancies. Pt proceeded with Rinvoq, and she prefers this because it is PO. The risks and special considerations of the JAK Inhibitor medications were discussed with the patient. This includes common side effects (headache, upper respiratory infection, nasopharyngitis, acne, urinary tract infection, and elevated cholesterol), increased risk of serious infections (including TB and herpes zoster), increased risk of blood clots (DVT and PE), and cardiovascular events in patients over the age of 50 with at least 1 heart disease risk factor (especially tobacco use). To mitigate these risks, baseline CBC, CMP, lipid panel, Hepatitis B, and QuantiFERON TB studies will be obtained prior to initial medication administration and periodic labs for medication monitoring. We recommend appropriate immunization (influenza, pneumonia, respiratory syncytial virus, herpes zoster, hepatitis B). Patient instructed to avoid live vaccinations while on small molecule therapy and for 4 weeks after discontinuation. Patient understands this medication may be teratogenic and that contraception or abstinence is required while on a MIRIAM inhibitor. Patient understands to notify the IBD team of pregnancy or upcoming major surgical operations, as this may require medication adjustment and collaboration between treating physicians. Patient given instructions for obtaining detailed resources on the medication through the drug manufacturers website as well as through the [employer] - pt received 2nd shingrix vaccine today in clinic  - continue loading dose with plan to drop down to maintenance after 12 weeks   HCM - refused annual flu vaccine - UTD annual dermatology   f/u virtually to discuss plan post-CT and Dr. Lebron consult if surgery will be planned

## 2025-03-22 NOTE — PHYSICAL EXAM
[Hearing Threshold Finger Rub Not [place]] : hearing was normal [Normal Appearance] : the appearance of the neck was normal [No Neck Mass] : no neck mass was observed [No Respiratory Distress] : no respiratory distress [No Masses] : no abdominal mass palpated [Abdomen Soft] : soft [Abnormal Walk] : normal gait [No Focal Deficits] : no focal deficits [Normal] : oriented to person, place, and time [Oriented To Time, Place, And Person] : oriented to person, place, and time [Normal Affect] : the affect was normal [Normal Mood] : the mood was normal [de-identified] : epigastric hernia felt on exertion of abdominal muscles  [de-identified] : scattered oval plaques consisting of small raised spots and skin scaling over bilateral shins and calves, erythematous peeling skin on palms with cracks

## 2025-03-22 NOTE — PHYSICAL EXAM
[Hearing Threshold Finger Rub Not [place]] : hearing was normal [Normal Appearance] : the appearance of the neck was normal [No Neck Mass] : no neck mass was observed [No Respiratory Distress] : no respiratory distress [No Masses] : no abdominal mass palpated [Abdomen Soft] : soft [Abnormal Walk] : normal gait [No Focal Deficits] : no focal deficits [Normal] : oriented to person, place, and time [Oriented To Time, Place, And Person] : oriented to person, place, and time [Normal Affect] : the affect was normal [Normal Mood] : the mood was normal [de-identified] : epigastric hernia felt on exertion of abdominal muscles  [de-identified] : scattered oval plaques consisting of small raised spots and skin scaling over bilateral shins and calves, erythematous peeling skin on palms with cracks

## 2025-03-22 NOTE — END OF VISIT
conducted an initial consultation and Spiritual Assessment for Tiffanie Tarango, who is a 48 y.o.,female. Patients Primary Language is: [place]. According to the patients EMR Zoroastrianism Affiliation is: No preference. The reason the Patient came to the hospital is:   Patient Active Problem List    Diagnosis Date Noted    SIRS (systemic inflammatory response syndrome) ([facility] [address] 07/08/2017    Pneumonia 07/07/2017    CAP (community acquired pneumonia) 07/07/2017        The  provided the following Interventions:  Initiated a relationship of care and support. Explored issues of lorna, belief, spirituality and Mandaeism/ritual needs while hospitalized. Listened empathically. Provided chaplaincy education. Provided information about Spiritual Care Services. Offered assurance of prayers on patient's behalf. Chart reviewed. The following outcomes where achieved:  Patient shared limited information about both their medical narrative and spiritual journey/beliefs.  confirmed Patient's Zoroastrianism Affiliation. Patient processed feeling about current hospitalization. Patient expressed gratitude for 's visit. Assessment:  Patient does not have any Mandaeism/cultural needs that will affect patients preferences in health care. There are no spiritual or Mandaeism issues which require intervention at this time. Plan:  Chaplains will continue to follow and will provide pastoral care on an as needed/requested basis.  recommends bedside caregivers page  on duty if patient shows signs of acute spiritual or emotional distress.     [address]   [phone]
[Time Spent: ___ minutes] : I have spent [unfilled] minutes of time on the encounter which excludes teaching and separately reported services.

## 2025-04-25 NOTE — HISTORY OF PRESENT ILLNESS
[Home] : at home, [unfilled] , at the time of the visit. [Medical Office: ([facility])___] : at the medical office located in  [Telephone (audio)] : This telephonic visit was provided via audio only technology. [Verbal consent obtained from patient] : the patient, [unfilled] [FreeTextEntry1] : 36-year-old female with strIcturing ileocolonic Crohn's Disease (currently on INFLECTRA, previously failed ADA and UST, previously screen failed for HAN, stricture/ileocolic resection 7/2023) -- currently in endoscopic remission 12/2024, eczema proven by biopsy worsened on IFX, now on Upadicitinib with resolution of eczema, stable clinical symptoms.   4/22/25: Pt reports feeling well but does notice some abd pain post-prandially. Denies n/v. Feels food sensitivities trigger abd pain. Meeting with surgeon tomorrow to discuss abdominal wall hernia seen on CT scan.   3/19/25: Pt reports she's very relieved her eczema has resolved. Denies worsening of IBD symptoms. Does report new onset epigastric abdominal pain where she feels a tightening and a bulge in her mid abdomen. No nausea/vomiting and no change in bowel habits. No fevers/chills. Unrelated to food or timing of day.   1/8/2025 Interval Hx: Clinic Visit Pt was last seen 12/5/2024, was complaining of lower abd pain for the past 2 months and headache and stool frequency. She also complained of worsening eczema. She went for colonoscopy on 12/10- colonic mucosa was normal appearing, left sided colon biopsies were unremarkable. Fecal calprotectin (12/9/2024) was 49. She has some lower abd discomfort when she has greasy foods.   Last inflectra dose was 12/5. Since she started Inflectra, pt reports worsening eczema on her hands, arms and legs, behind ears. She has biopsy proven eczema, has been using creams without improvement. Many times during the week, her eczema patches on shins ooze. Dermatologist advised possible switching to Rinvoq to help with eczema/Crohn's simultaneously. She is  now, has 4 kids at home. No plans for more kids at this time  Labs 12/10/2024:Fecal calpro 49, Liver enzymes WNL Colonoscopy 12/10/2024 : Normal mucosa in the karyna terminal ileum consistent with i0 disease. Side to side ileocolonic anastomosis. Grade/Stage I internal hemorrhoids.The colon was otherwise unremarkable, indicating endoscopic remission of Crohn's disease, which is the same as previous colonoscopy 4/2024. This is c/w efficacy of this MOA.  Left colon; biopsy: -   Colonic mucosa without significant histopathologic findings.   PRIOR: H. pylori breath test negative.   Pt reports today that she is overall doing much better than previous since her surgery. States she can have anywhere from 0-4 BMs daily but usually 2-3. Describes stool as soft non-painful non-bloody bowel movements per day. Previously complained of esophageal/upper GI discomfort while eating, stating she could feel food and liquid moving through her esophagus while eating and feels headache right away. She gets cramping with certain foods. Celiac (-). Reports abdomen gets stiff and often feels like "intestines are working hard".  Denies joint pain and ulcers.  Had a rash which developed a few months ago and saw DERM who did biopsy which was found to be eczema. Pt curious if rash could be from inflectra. Has hemorrhoid that comes and goes, can be painful when uses the bathroom. Las infusion 4/18/24 and pt reports no issues. Labs taken reveal AST 42 and ALT 65.  After multiple medication failures, patient previously refused IFX initiation to attempt dietary modifications until hospital admission due to SBO in 6/2023, which resulted in resection of terminal ileum stricture/ileocolic resection with Dr. Lyon 7/2023. She was initiated on IFX biosimilar postoperatively and has now completed 6 infusions. She denies side effects from receiving the infusions. She denies extraintestinal manifestations. She states that since surgery, she has had significant improvement in abdominal pain and is able to tolerate most foods without difficulty. Denies nausea and vomiting and bloating.   Cscope (4/11/24): The colon was unremarkable to the level of the ileocolonic anastomosis, minimal ulceration at staple line. NeoTI did not have any inflammation. path: left colon biopsy showing increased chronic inflammatory cells.   EGD (4/11/24): normal esophagus, localized granularity in stomach, normal duodenum. path: Gastric mucosa showing focal mild chronic active inflammation, Helicobacter pylori-like microorganisms present. No intestinal metaplasia or dysplasia identified.  7/5/23 - Laparoscopic Ileocolic Resection with Dr. Lyon Pathology: Ileal and cecal resection: Moderate to severely chronic active ileitis w/ focal ulceration and intramural abscess and moderate to severe colitis at the ICV Acute and chronic serositis and serosal fibrous adhesions Unremarkable appendix 2 hyperplastic region LNs No active disease seen at resection margins Negative for dysplasia

## 2025-04-25 NOTE — HISTORY OF PRESENT ILLNESS
[Home] : at home, [unfilled] , at the time of the visit. [Medical Office: ([facility])___] : at the medical office located in  [Telephone (audio)] : This telephonic visit was provided via audio only technology. [Verbal consent obtained from patient] : the patient, [unfilled] [FreeTextEntry1] : 36-year-old female with strIcturing ileocolonic Crohn's Disease (currently on INFLECTRA, previously failed ADA and UST, previously screen failed for HAN, stricture/ileocolic resection 7/2023) -- currently in endoscopic remission 12/2024, eczema proven by biopsy worsened on IFX, now on Upadicitinib with resolution of eczema, stable clinical symptoms.   4/22/25: Pt reports feeling well but does notice some abd pain post-prandially. Denies n/v. Feels food sensitivities trigger abd pain. Meeting with surgeon tomorrow to discuss abdominal wall hernia seen on CT scan.   3/19/25: Pt reports she's very relieved her eczema has resolved. Denies worsening of IBD symptoms. Does report new onset epigastric abdominal pain where she feels a tightening and a bulge in her mid abdomen. No nausea/vomiting and no change in bowel habits. No fevers/chills. Unrelated to food or timing of day.   1/8/2025 Interval Hx: Clinic Visit Pt was last seen 12/5/2024, was complaining of lower abd pain for the past 2 months and headache and stool frequency. She also complained of worsening eczema. She went for colonoscopy on 12/10- colonic mucosa was normal appearing, left sided colon biopsies were unremarkable. Fecal calprotectin (12/9/2024) was 49. She has some lower abd discomfort when she has greasy foods.   Last inflectra dose was 12/5. Since she started Inflectra, pt reports worsening eczema on her hands, arms and legs, behind ears. She has biopsy proven eczema, has been using creams without improvement. Many times during the week, her eczema patches on shins ooze. Dermatologist advised possible switching to Rinvoq to help with eczema/Crohn's simultaneously. She is  now, has 4 kids at home. No plans for more kids at this time  Labs 12/10/2024:Fecal calpro 49, Liver enzymes WNL Colonoscopy 12/10/2024 : Normal mucosa in the karyna terminal ileum consistent with i0 disease. Side to side ileocolonic anastomosis. Grade/Stage I internal hemorrhoids.The colon was otherwise unremarkable, indicating endoscopic remission of Crohn's disease, which is the same as previous colonoscopy 4/2024. This is c/w efficacy of this MOA.  Left colon; biopsy: -   Colonic mucosa without significant histopathologic findings.   PRIOR: H. pylori breath test negative.   Pt reports today that she is overall doing much better than previous since her surgery. States she can have anywhere from 0-4 BMs daily but usually 2-3. Describes stool as soft non-painful non-bloody bowel movements per day. Previously complained of esophageal/upper GI discomfort while eating, stating she could feel food and liquid moving through her esophagus while eating and feels headache right away. She gets cramping with certain foods. Celiac (-). Reports abdomen gets stiff and often feels like "intestines are working hard".  Denies joint pain and ulcers.  Had a rash which developed a few months ago and saw DERM who did biopsy which was found to be eczema. Pt curious if rash could be from inflectra. Has hemorrhoid that comes and goes, can be painful when uses the bathroom. Las infusion 4/18/24 and pt reports no issues. Labs taken reveal AST 42 and ALT 65.  After multiple medication failures, patient previously refused IFX initiation to attempt dietary modifications until hospital admission due to SBO in 6/2023, which resulted in resection of terminal ileum stricture/ileocolic resection with Dr. Lyon 7/2023. She was initiated on IFX biosimilar postoperatively and has now completed 6 infusions. She denies side effects from receiving the infusions. She denies extraintestinal manifestations. She states that since surgery, she has had significant improvement in abdominal pain and is able to tolerate most foods without difficulty. Denies nausea and vomiting and bloating.   Cscope (4/11/24): The colon was unremarkable to the level of the ileocolonic anastomosis, minimal ulceration at staple line. NeoTI did not have any inflammation. path: left colon biopsy showing increased chronic inflammatory cells.   EGD (4/11/24): normal esophagus, localized granularity in stomach, normal duodenum. path: Gastric mucosa showing focal mild chronic active inflammation, Helicobacter pylori-like microorganisms present. No intestinal metaplasia or dysplasia identified.  7/5/23 - Laparoscopic Ileocolic Resection with Dr. Lyon Pathology: Ileal and cecal resection: Moderate to severely chronic active ileitis w/ focal ulceration and intramural abscess and moderate to severe colitis at the ICV Acute and chronic serositis and serosal fibrous adhesions Unremarkable appendix 2 hyperplastic region LNs No active disease seen at resection margins Negative for dysplasia

## 2025-04-25 NOTE — ASSESSMENT
[FreeTextEntry1] : 36-year-old female with strIcturing ileocolonic Crohn's Disease (currently on INFLECTRA, previously failed ADA and UST, previously screen failed for HAN, stricture/ileocolic resection 7/2023) -- currently in endoscopic remission 12/2024, eczema proven by biopsy worsened on IFX, now on Upadicitinib with resolution of eczema, stable clinical symptoms however does report some abd pain after meals.   #New onset abdominal pain with bulge - c/w Incisional hernia  - on physical exam at last visit, hernia present when patient performs sit-up - Dr. Lebron present briefly introduced to the patient; advised she undergo CT without contrast to identify hernia and plan for surgery - pt has underwent CT scan and has appt in 2 weeks to discuss   #Stricturing ileocolonic Crohn's disease - in endoscopic remission 12/10/2024 colon, previously on Inflectra however, pt was transitioned to UPA due to poorly controlled eczema that started around the time Inflectra started. This has significantly impacted her day to day life. Last dose Inflectra 12/5- since stopped with resolution of eczema - pt tolerating UPA well  - pain after meals - plan for for MRE to rule out small bowel inflam after she meets with Dr. Lebron and after surgery if planned  - extensive discussion regarding risk benefit of Skyrizi vs. Rinvoq at last visit - including the increased risk of DVT, PE, shingles, and CAD in Rinvoq pts. She has no plans for further pregnancies. Pt proceeded with Rinvoq, and she prefers this because it is PO. The risks and special considerations of the JAK Inhibitor medications were discussed with the patient. This includes common side effects (headache, upper respiratory infection, nasopharyngitis, acne, urinary tract infection, and elevated cholesterol), increased risk of serious infections (including TB and herpes zoster), increased risk of blood clots (DVT and PE), and cardiovascular events in patients over the age of 50 with at least 1 heart disease risk factor (especially tobacco use). To mitigate these risks, baseline CBC, CMP, lipid panel, Hepatitis B, and QuantiFERON TB studies will be obtained prior to initial medication administration and periodic labs for medication monitoring. We recommend appropriate immunization (influenza, pneumonia, respiratory syncytial virus, herpes zoster, hepatitis B). Patient instructed to avoid live vaccinations while on small molecule therapy and for 4 weeks after discontinuation. Patient understands this medication may be teratogenic and that contraception or abstinence is required while on a MIRIAM inhibitor. Patient understands to notify the IBD team of pregnancy or upcoming major surgical operations, as this may require medication adjustment and collaboration between treating physicians. Patient given instructions for obtaining detailed resources on the medication through the drug manufacturers website as well as through the [employer] - pt received 2nd shingrix vaccine today in clinic  - continue loading dose with plan to drop down to maintenance after 12 weeks   HCM - refused annual flu vaccine - UTD annual dermatology   f/u virtually to discuss plan post-CT and f/u Dr. Lebron

## 2025-04-25 NOTE — HISTORY OF PRESENT ILLNESS
[Home] : at home, [unfilled] , at the time of the visit. [Medical Office: ([facility])___] : at the medical office located in  [Telephone (audio)] : This telephonic visit was provided via audio only technology. [Verbal consent obtained from patient] : the patient, [unfilled] [FreeTextEntry1] : 36-year-old female with strIcturing ileocolonic Crohn's Disease (currently on INFLECTRA, previously failed ADA and UST, previously screen failed for HAN, stricture/ileocolic resection 7/2023) -- currently in endoscopic remission 12/2024, eczema proven by biopsy worsened on IFX, now on Upadicitinib with resolution of eczema, stable clinical symptoms.   4/22/25: Pt reports feeling well but does notice some abd pain post-prandially. Denies n/v. Feels food sensitivities trigger abd pain. Meeting with surgeon tomorrow to discuss abdominal wall hernia seen on CT scan.   3/19/25: Pt reports she's very relieved her eczema has resolved. Denies worsening of IBD symptoms. Does report new onset epigastric abdominal pain where she feels a tightening and a bulge in her mid abdomen. No nausea/vomiting and no change in bowel habits. No fevers/chills. Unrelated to food or timing of day.   1/8/2025 Interval Hx: Clinic Visit Pt was last seen 12/5/2024, was complaining of lower abd pain for the past 2 months and headache and stool frequency. She also complained of worsening eczema. She went for colonoscopy on 12/10- colonic mucosa was normal appearing, left sided colon biopsies were unremarkable. Fecal calprotectin (12/9/2024) was 49. She has some lower abd discomfort when she has greasy foods.   Last inflectra dose was 12/5. Since she started Inflectra, pt reports worsening eczema on her hands, arms and legs, behind ears. She has biopsy proven eczema, has been using creams without improvement. Many times during the week, her eczema patches on shins ooze. Dermatologist advised possible switching to Rinvoq to help with eczema/Crohn's simultaneously. She is  now, has 4 kids at home. No plans for more kids at this time  Labs 12/10/2024:Fecal calpro 49, Liver enzymes WNL Colonoscopy 12/10/2024 : Normal mucosa in the karyna terminal ileum consistent with i0 disease. Side to side ileocolonic anastomosis. Grade/Stage I internal hemorrhoids.The colon was otherwise unremarkable, indicating endoscopic remission of Crohn's disease, which is the same as previous colonoscopy 4/2024. This is c/w efficacy of this MOA.  Left colon; biopsy: -   Colonic mucosa without significant histopathologic findings.   PRIOR: H. pylori breath test negative.   Pt reports today that she is overall doing much better than previous since her surgery. States she can have anywhere from 0-4 BMs daily but usually 2-3. Describes stool as soft non-painful non-bloody bowel movements per day. Previously complained of esophageal/upper GI discomfort while eating, stating she could feel food and liquid moving through her esophagus while eating and feels headache right away. She gets cramping with certain foods. Celiac (-). Reports abdomen gets stiff and often feels like "intestines are working hard".  Denies joint pain and ulcers.  Had a rash which developed a few months ago and saw DERM who did biopsy which was found to be eczema. Pt curious if rash could be from inflectra. Has hemorrhoid that comes and goes, can be painful when uses the bathroom. Las infusion 4/18/24 and pt reports no issues. Labs taken reveal AST 42 and ALT 65.  After multiple medication failures, patient previously refused IFX initiation to attempt dietary modifications until hospital admission due to SBO in 6/2023, which resulted in resection of terminal ileum stricture/ileocolic resection with Dr. Lyon 7/2023. She was initiated on IFX biosimilar postoperatively and has now completed 6 infusions. She denies side effects from receiving the infusions. She denies extraintestinal manifestations. She states that since surgery, she has had significant improvement in abdominal pain and is able to tolerate most foods without difficulty. Denies nausea and vomiting and bloating.   Cscope (4/11/24): The colon was unremarkable to the level of the ileocolonic anastomosis, minimal ulceration at staple line. NeoTI did not have any inflammation. path: left colon biopsy showing increased chronic inflammatory cells.   EGD (4/11/24): normal esophagus, localized granularity in stomach, normal duodenum. path: Gastric mucosa showing focal mild chronic active inflammation, Helicobacter pylori-like microorganisms present. No intestinal metaplasia or dysplasia identified.  7/5/23 - Laparoscopic Ileocolic Resection with Dr. Lyon Pathology: Ileal and cecal resection: Moderate to severely chronic active ileitis w/ focal ulceration and intramural abscess and moderate to severe colitis at the ICV Acute and chronic serositis and serosal fibrous adhesions Unremarkable appendix 2 hyperplastic region LNs No active disease seen at resection margins Negative for dysplasia

## 2025-04-28 NOTE — PLAN
[FreeTextEntry1] : Ventral hernia, symptomatic  - F/u standard pre-op labs and EKG  - Obtain PCP clearance  - CT A/P w/o contrast already on PACs   - Plan for robotic assisted, possible open, ___  inguinal /abdominal / ventral / incisional / umbilical hernia repair  - Go to ED if pain is resistant to pain medication or if symptoms worsen (N/V/fever, chills)

## 2025-04-28 NOTE — ADDENDUM
[FreeTextEntry1] :  I, HE PEYMAN, am scribing for and in the presence of Dr. Lebron for the following sections: HISTORY OF PRESENT ILLNESS, PAST MEDICAL HISTORY, PAST SURGICAL HISTORY, FAMILY/SOCIAL HISTORY, REVIEW OF SYSTEMS, VITAL SIGNS, PHYSICAL EXAM, DISPOSITION.Reviewed current treatment plan, including medications / surgical intervention / lifestyle modifications where indicated. Reviewed imaging, laboratory results, or other diagnostics as applicable. Addressed patient concerns, including potential complications, risk factors, or alternative treatment options as applicable. Provided detailed education on pre- or post-operative instructions,and expected outcome as applicable. Instructed pt to call office for questions or concerns. Instructed patient to schedule follow-up appointment as recommended. Referrals and testing ordered where indicated. Plan of care reviewed with patient, and questions answered. The total time spent with patient included more than 50% of the time dedicated to counseling and coordination of care.

## 2025-04-28 NOTE — ASSESSMENT
[FreeTextEntry1] : This is a 35 y/o Female presents today for initial evaluation of ventral hernia.   The pt with a PMHx of Crohn disease, dermatitis, heartburn and PSHx of ........ Patient reports worsening of discomfort over the last.......weeks/month in abdomen exacerbated by coughing, straining, sneezing, lifting. Patient reports expansion of bulging/swelling that is reducible and associated with intermittent non-radiating pain. Relief with self-reduction/low activity/reclining. Denies nausea, vomiting, fever, chills, pain out of proportion, chest pain, HA, dizziness. Denies constipation, difficulty with bowel movements or urination. Denies shortness of breath, RHODES, or difficulty breathing, patient can walk / climb 2 flights without stopping. Denies hx of blood clots or bleeding problems.   Imaging: CT A/P (04/02/25): Midline anterior abdominal wall hernia containing nonobstructed loops of small bowel and mesenteric fat. The defect measures approximately 2.8 cm across in the axial plane and 2.8 cm craniocaudal.   Exam significant for:  + ??incisional/ parastomal/ spigelian hernia, reducible.   The nature and risks of hernia were discussed as were signs and symptoms of incarceration, obstruction and strangulation as possible risks of observation. A thorough preoperative education has been performed about the role and the different surgical options have been discussed with patient. Risks, benefits and alternatives have been discussed and patient verbalizes understanding. Laparoscopic/Robotic/Open repair of inguinal hernia/abdominal hernia was discussed with the patient at length. The risks, benefits, and alternatives of the types of repair as well as to the use of mesh were discussed and all questions answered. Risks of hernia repair include, but are not limited to infection, bleeding, recurrence and injury to adjacent structures and nerves. Advised patient about possible risk of future complications if hernia worsens or goes untreated. Patient would like to proceed with RA ventral repair with mesh.

## 2025-04-28 NOTE — PHYSICAL EXAM
[Normal Breath Sounds] : Normal breath sounds [Normal Heart Sounds] : normal heart sounds [No Rash or Lesion] : No rash or lesion [Alert] : alert [Calm] : calm [JVD] : no jugular venous distention  [de-identified] :  in no acute distress. Well- developed, well-nourished. [de-identified] : normocephalic [de-identified] : soft, non-distended, non-tender, no rebound or guarding. ?? +right/ left/ mid abdomen/ inguinal hernia, reducible. [de-identified] : deferred [de-identified] : normal range of motions

## 2025-05-07 NOTE — PLAN
[FreeTextEntry1] : Incisional/ Ventral hernia, symptomatic  - F/u standard pre-op labs   - Obtain PCP clearance  - CT A/P w/o contrast already on PACs, reviewed with the pt (4cm defect, supraumbilical with 8cm rectus).  - Plan for robotic assisted, possible open, supraumbilical incisional/ventral hernia repair with mesh (R ETEP).  - Go to ED if pain is resistant to pain medication or if symptoms worsen (N/V/fever, chills).

## 2025-05-07 NOTE — PLAN
Follow up:  1. You will hear from [facility] about scheduling surgery. Please allow for a couple days.  2. You will need to see your PCP.   3. You will need to do 2 fleet enema for prep the day of surgery     Please call with any questions or concerns regarding your clinic visit today.    It is a pleasure being involved in your health care.    Contacts post-consultation depending on your need:    Radiology Appointments [phone]    Schedule Clinic Appointments [phone] # 1   M-F 7:30 - 5 pm    [facility] [phone]    Clinic Fax Number [phone]    Surgery Scheduling [phone]    My Chart is available 24 hours a day and is a secure way to access your records and communicate with your care team.  I strongly recommend signing up if you haven't already done so, if you are comfortable with computers.  If you would like to inquire about this or are having problems with My Chart access, you may call [phone] or go online at [email or web address].  Please allow at least 24 hours for a response and extra time on weekends and Holidays.       [FreeTextEntry1] : Incisional/ Ventral hernia, symptomatic  - F/u standard pre-op labs   - Obtain PCP clearance  - CT A/P w/o contrast already on PACs, reviewed with the pt (4cm defect, supraumbilical with 8cm rectus).  - Plan for robotic assisted, possible open, supraumbilical incisional/ventral hernia repair with mesh (R ETEP).  - Go to ED if pain is resistant to pain medication or if symptoms worsen (N/V/fever, chills).

## 2025-05-07 NOTE — REASON FOR VISIT
[Initial Evaluation] : an initial evaluation [FreeTextEntry1] : midline abdominal incisional/ ventral hernia

## 2025-05-07 NOTE — ASSESSMENT
[FreeTextEntry1] : This is a 37 y/o Female presents today for initial evaluation of midline abdominal incisional/ ventral hernia.  The pt with a PMHx of Crohn disease, dermatitis, heartburn, Obesity (BMI32) and PSHx of partial colectomy (2023).  Patient reports worsening of discomfort in mid-abdomen since 2023 after the colectomy surgery exacerbated by coughing, straining, sneezing, lifting. Patient reports no visible bulging. Relief with self-reduction/low activity/reclining. Denies nausea, vomiting, fever, chills, pain out of proportion, chest pain, HA, dizziness. Denies constipation, difficulty with bowel movements or urination. Denies shortness of breath, RHODES, or difficulty breathing, patient can walk / climb 2 flights without stopping. Denies hx of blood clots or bleeding problems.  Imaging: CT A/P (04/02/25): 4cm defect, supraumbilical with 8cm rectus. Midline anterior abdominal wall hernia containing nonobstructed loops of small bowel and mesenteric fat. The defect measures approximately 2.8 cm across in the axial plane and 2.8 cm craniocaudal.  Exam significant for:  + moderate size midline abdomen incisional hernia upon palpation.  The nature and risks of hernia were discussed as were signs and symptoms of incarceration, obstruction and strangulation as possible risks of observation. A thorough preoperative education has been performed about the role and the different surgical options have been discussed with patient. Risks, benefits and alternatives have been discussed and patient verbalizes understanding. Laparoscopic/Robotic/Open repair of inguinal hernia/abdominal hernia was discussed with the patient at length. The risks, benefits, and alternatives of the types of repair as well as to the use of mesh were discussed and all questions answered. Risks of hernia repair include, but are not limited to infection, bleeding, recurrence and injury to adjacent structures and nerves. Advised patient about possible risk of future complications if hernia worsens or goes untreated. Patient would like to proceed with RA incisional/ ventral repair with mesh.

## 2025-05-07 NOTE — PHYSICAL EXAM
[Normal Breath Sounds] : Normal breath sounds [Normal Heart Sounds] : normal heart sounds [No Rash or Lesion] : No rash or lesion [Alert] : alert [Calm] : calm [JVD] : no jugular venous distention  [de-identified] :  in no acute distress. Well- developed, well-nourished. [de-identified] : normocephalic [de-identified] : soft, non-distended, non-tender, no rebound or guarding.  + moderate size midline abdominal incisional hernia upon palpation. [de-identified] : deferred [de-identified] : normal range of motions

## 2025-05-07 NOTE — HISTORY OF PRESENT ILLNESS
[de-identified] : This is a 35 y/o Female presents today for initial evaluation of midline abdominal incisional/ ventral hernia.  The pt with a PMHx of Crohn disease, dermatitis, heartburn, Obesity (BMI32) and PSHx of partial colectomy (2023).  Patient reports worsening of discomfort in mid-abdomen since 2023 after the colectomy surgery exacerbated by coughing, straining, sneezing, lifting. Patient reports no visible bulging. Relief with self-reduction/low activity/reclining. Denies nausea, vomiting, fever, chills, pain out of proportion, chest pain, HA, dizziness. Denies constipation, difficulty with bowel movements or urination. Denies shortness of breath, RHODES, or difficulty breathing, patient can walk / climb 2 flights without stopping. Denies hx of blood clots or bleeding problems.

## 2025-05-07 NOTE — PHYSICAL EXAM
[Normal Breath Sounds] : Normal breath sounds [Normal Heart Sounds] : normal heart sounds [No Rash or Lesion] : No rash or lesion [Alert] : alert [Calm] : calm [JVD] : no jugular venous distention  [de-identified] :  in no acute distress. Well- developed, well-nourished. [de-identified] : normocephalic [de-identified] : soft, non-distended, non-tender, no rebound or guarding.  + moderate size midline abdominal incisional hernia upon palpation. [de-identified] : deferred [de-identified] : normal range of motions

## 2025-05-07 NOTE — HISTORY OF PRESENT ILLNESS
[de-identified] : This is a 35 y/o Female presents today for initial evaluation of midline abdominal incisional/ ventral hernia.  The pt with a PMHx of Crohn disease, dermatitis, heartburn, Obesity (BMI32) and PSHx of partial colectomy (2023).  Patient reports worsening of discomfort in mid-abdomen since 2023 after the colectomy surgery exacerbated by coughing, straining, sneezing, lifting. Patient reports no visible bulging. Relief with self-reduction/low activity/reclining. Denies nausea, vomiting, fever, chills, pain out of proportion, chest pain, HA, dizziness. Denies constipation, difficulty with bowel movements or urination. Denies shortness of breath, RHODES, or difficulty breathing, patient can walk / climb 2 flights without stopping. Denies hx of blood clots or bleeding problems.

## 2025-05-08 NOTE — ASSESSMENT
[FreeTextEntry1] : 36 y.o overweight woman with inflam bowel disease on biologic medicines who has essentially S-2 steatosis of liver. Liver problem is not secondary to biologic med, is not PSC, but appears to be 2/2 obesity & overweight status Eval for other cause of abn liver enzymes: negative Enzymes  have recently been in normal phase. Patient advised re nutritional control and need for low carbohydrate control diet with weight loss also increased aerobic exercise Patient wishes to manage her own nutritional control status Will return in two to three months to eval progress   79F with PMHx of HTN, HLD,  hypothyroidism, generalized depression, ADHD rheumatic fever, pericarditis with recent L1 compression fracture 2/2 mechanical fall (pending orthopedic intervention with Dr. Barcenas later today), presents after unwitnessed syncopal event and head trauma likely 2/2 to medication side effect vs dehydration complicated by MARY LOU, with incidental finding of RUL opacity c/f malignancy for which oncology has been consulted.    Smoking history: 2 packs per day for 20 years (40py) stopped in 1982. 25 years of second hand smoking exposure.  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  She believes her dad may have passed 2/2 lung cancer +/- MS but is unsure of exact diagnoses.  No personal history of other cancers.     ANTIBIOTICS/RELEVANT:  MEDICATIONS  (STANDING):  atorvastatin 80 milliGRAM(s) Oral at bedtime  buPROPion XL (24-Hour) . 300 milliGRAM(s) Oral daily  carvedilol 6.25 milliGRAM(s) Oral every 12 hours  chlorhexidine 2% Cloths 1 Application(s) Topical two times a day  dextrose 5%. 1000 milliLiter(s) (100 mL/Hr) IV Continuous <Continuous>  dextrose 5%. 1000 milliLiter(s) (50 mL/Hr) IV Continuous <Continuous>  dextrose 50% Injectable 25 Gram(s) IV Push once  dextrose 50% Injectable 12.5 Gram(s) IV Push once  dextrose 50% Injectable 25 Gram(s) IV Push once  glucagon  Injectable 1 milliGRAM(s) IntraMuscular once  levothyroxine 75 MICROGram(s) Oral daily    MEDICATIONS  (PRN):  acetaminophen     Tablet .. 650 milliGRAM(s) Oral every 6 hours PRN Temp greater or equal to 38C (100.4F), Mild Pain (1 - 3)  dextrose Oral Gel 15 Gram(s) Oral once PRN Blood Glucose LESS THAN 70 milliGRAM(s)/deciliter  loperamide 2 milliGRAM(s) Oral every 4 hours PRN Diarrhea  melatonin 3 milliGRAM(s) Oral at bedtime PRN Insomnia      Vital Signs Last 24 Hrs  T(C): 36.6 (08 May 2025 09:23), Max: 36.6 (07 May 2025 22:09)  T(F): 97.8 (08 May 2025 09:23), Max: 97.9 (07 May 2025 22:09)  HR: 65 (08 May 2025 08:39) (65 - 93)  BP: 163/67 (08 May 2025 08:39) (134/59 - 188/72)  BP(mean): 96 (08 May 2025 08:39) (85 - 124)  RR: 18 (08 May 2025 08:39) (16 - 48)  SpO2: 98% (08 May 2025 08:39) (98% - 100%)    Parameters below as of 08 May 2025 08:39  Patient On (Oxygen Delivery Method): room air    PHYSICAL EXAM:  Constitutional: well-appearing, in no apparent respiratory distress  HEENT: anicteric sclera; moist mucous membranes  Neck: supple, no JVD appreciated  Cardiovascular: +S1/S2, regular rate and rhythm, no murmurs appreciated   Respiratory: on RA, clear breath sounds bilaterally. No wheezes, rhonchi or rales   Gastrointestinal: soft, non-tender, non-distended  Extremities: no edema  Neurological: awake and alert; oriented x3      LABS:                        9.7    7.17  )-----------( 257      ( 08 May 2025 05:30 )             29.2     05-08    137  |  105  |  52[H]  ----------------------------<  86  3.6   |  17[L]  |  3.73[H]    Ca    7.8[L]      08 May 2025 05:30  Phos  3.3     05-08  Mg     1.8     05-08    TPro  6.2  /  Alb  3.5  /  TBili  0.4  /  DBili  x   /  AST  59[H]  /  ALT  51[H]  /  AlkPhos  380[H]  05-07

## 2025-05-22 NOTE — HISTORY OF PRESENT ILLNESS
[Home] : at home, [unfilled] , at the time of the visit. [Medical Office: ([facility])___] : at the medical office located in  [Telephone (audio)] : This telephonic visit was provided via audio only technology. [Verbal consent obtained from patient] : the patient, [unfilled] [FreeTextEntry1] : 36-year-old female with strIcturing ileocolonic Crohn's Disease (currently on INFLECTRA, previously failed ADA and UST, previously screen failed for HAN, stricture/ileocolic resection 7/2023) -- currently in endoscopic remission 12/2024, eczema proven by biopsy worsened on IFX, now on Upadicitinib with resolution of eczema, stable clinical symptoms.   5/22/25: Pt continues to have abd pain post-prandially that she feels is coming from her Crohn's disease. She was evaluated by Dr. Lebron and planning to undergo hernia repair surgery in August 2025 due to travel.   4/22/25: Pt reports feeling well but does notice some abd pain post-prandially. Denies n/v. Feels food sensitivities trigger abd pain. Meeting with surgeon tomorrow to discuss abdominal wall hernia seen on CT scan.   3/19/25: Pt reports she's very relieved her eczema has resolved. Denies worsening of IBD symptoms. Does report new onset epigastric abdominal pain where she feels a tightening and a bulge in her mid abdomen. No nausea/vomiting and no change in bowel habits. No fevers/chills. Unrelated to food or timing of day.   1/8/2025 Interval Hx: Clinic Visit Pt was last seen 12/5/2024, was complaining of lower abd pain for the past 2 months and headache and stool frequency. She also complained of worsening eczema. She went for colonoscopy on 12/10- colonic mucosa was normal appearing, left sided colon biopsies were unremarkable. Fecal calprotectin (12/9/2024) was 49. She has some lower abd discomfort when she has greasy foods.   Last inflectra dose was 12/5. Since she started Inflectra, pt reports worsening eczema on her hands, arms and legs, behind ears. She has biopsy proven eczema, has been using creams without improvement. Many times during the week, her eczema patches on shins ooze. Dermatologist advised possible switching to Rinvoq to help with eczema/Crohn's simultaneously. She is  now, has 4 kids at home. No plans for more kids at this time  Labs 12/10/2024:Fecal calpro 49, Liver enzymes WNL Colonoscopy 12/10/2024 : Normal mucosa in the karyna terminal ileum consistent with i0 disease. Side to side ileocolonic anastomosis. Grade/Stage I internal hemorrhoids.The colon was otherwise unremarkable, indicating endoscopic remission of Crohn's disease, which is the same as previous colonoscopy 4/2024. This is c/w efficacy of this MOA.  Left colon; biopsy: -   Colonic mucosa without significant histopathologic findings.   PRIOR: H. pylori breath test negative.   Pt reports today that she is overall doing much better than previous since her surgery. States she can have anywhere from 0-4 BMs daily but usually 2-3. Describes stool as soft non-painful non-bloody bowel movements per day. Previously complained of esophageal/upper GI discomfort while eating, stating she could feel food and liquid moving through her esophagus while eating and feels headache right away. She gets cramping with certain foods. Celiac (-). Reports abdomen gets stiff and often feels like "intestines are working hard".  Denies joint pain and ulcers.  Had a rash which developed a few months ago and saw DERM who did biopsy which was found to be eczema. Pt curious if rash could be from inflectra. Has hemorrhoid that comes and goes, can be painful when uses the bathroom. Las infusion 4/18/24 and pt reports no issues. Labs taken reveal AST 42 and ALT 65.  After multiple medication failures, patient previously refused IFX initiation to attempt dietary modifications until hospital admission due to SBO in 6/2023, which resulted in resection of terminal ileum stricture/ileocolic resection with Dr. Lyon 7/2023. She was initiated on IFX biosimilar postoperatively and has now completed 6 infusions. She denies side effects from receiving the infusions. She denies extraintestinal manifestations. She states that since surgery, she has had significant improvement in abdominal pain and is able to tolerate most foods without difficulty. Denies nausea and vomiting and bloating.   Cscope (4/11/24): The colon was unremarkable to the level of the ileocolonic anastomosis, minimal ulceration at staple line. NeoTI did not have any inflammation. path: left colon biopsy showing increased chronic inflammatory cells.   EGD (4/11/24): normal esophagus, localized granularity in stomach, normal duodenum. path: Gastric mucosa showing focal mild chronic active inflammation, Helicobacter pylori-like microorganisms present. No intestinal metaplasia or dysplasia identified.  7/5/23 - Laparoscopic Ileocolic Resection with Dr. Lyon Pathology: Ileal and cecal resection: Moderate to severely chronic active ileitis w/ focal ulceration and intramural abscess and moderate to severe colitis at the ICV Acute and chronic serositis and serosal fibrous adhesions Unremarkable appendix 2 hyperplastic region LNs No active disease seen at resection margins Negative for dysplasia

## 2025-05-22 NOTE — HISTORY OF PRESENT ILLNESS
[Home] : at home, [unfilled] , at the time of the visit. [Medical Office: ([facility])___] : at the medical office located in  [Telephone (audio)] : This telephonic visit was provided via audio only technology. [Verbal consent obtained from patient] : the patient, [unfilled] [FreeTextEntry1] : 36-year-old female with strIcturing ileocolonic Crohn's Disease (currently on INFLECTRA, previously failed ADA and UST, previously screen failed for HAN, stricture/ileocolic resection 7/2023) -- currently in endoscopic remission 12/2024, eczema proven by biopsy worsened on IFX, now on Upadicitinib with resolution of eczema, stable clinical symptoms.   5/22/25: Pt continues to have abd pain post-prandially that she feels is coming from her Crohn's disease. She was evaluated by Dr. Lebron and planning to undergo hernia repair surgery in August 2025 due to travel.   4/22/25: Pt reports feeling well but does notice some abd pain post-prandially. Denies n/v. Feels food sensitivities trigger abd pain. Meeting with surgeon tomorrow to discuss abdominal wall hernia seen on CT scan.   3/19/25: Pt reports she's very relieved her eczema has resolved. Denies worsening of IBD symptoms. Does report new onset epigastric abdominal pain where she feels a tightening and a bulge in her mid abdomen. No nausea/vomiting and no change in bowel habits. No fevers/chills. Unrelated to food or timing of day.   1/8/2025 Interval Hx: Clinic Visit Pt was last seen 12/5/2024, was complaining of lower abd pain for the past 2 months and headache and stool frequency. She also complained of worsening eczema. She went for colonoscopy on 12/10- colonic mucosa was normal appearing, left sided colon biopsies were unremarkable. Fecal calprotectin (12/9/2024) was 49. She has some lower abd discomfort when she has greasy foods.   Last inflectra dose was 12/5. Since she started Inflectra, pt reports worsening eczema on her hands, arms and legs, behind ears. She has biopsy proven eczema, has been using creams without improvement. Many times during the week, her eczema patches on shins ooze. Dermatologist advised possible switching to Rinvoq to help with eczema/Crohn's simultaneously. She is  now, has 4 kids at home. No plans for more kids at this time  Labs 12/10/2024:Fecal calpro 49, Liver enzymes WNL Colonoscopy 12/10/2024 : Normal mucosa in the karyna terminal ileum consistent with i0 disease. Side to side ileocolonic anastomosis. Grade/Stage I internal hemorrhoids.The colon was otherwise unremarkable, indicating endoscopic remission of Crohn's disease, which is the same as previous colonoscopy 4/2024. This is c/w efficacy of this MOA.  Left colon; biopsy: -   Colonic mucosa without significant histopathologic findings.   PRIOR: H. pylori breath test negative.   Pt reports today that she is overall doing much better than previous since her surgery. States she can have anywhere from 0-4 BMs daily but usually 2-3. Describes stool as soft non-painful non-bloody bowel movements per day. Previously complained of esophageal/upper GI discomfort while eating, stating she could feel food and liquid moving through her esophagus while eating and feels headache right away. She gets cramping with certain foods. Celiac (-). Reports abdomen gets stiff and often feels like "intestines are working hard".  Denies joint pain and ulcers.  Had a rash which developed a few months ago and saw DERM who did biopsy which was found to be eczema. Pt curious if rash could be from inflectra. Has hemorrhoid that comes and goes, can be painful when uses the bathroom. Las infusion 4/18/24 and pt reports no issues. Labs taken reveal AST 42 and ALT 65.  After multiple medication failures, patient previously refused IFX initiation to attempt dietary modifications until hospital admission due to SBO in 6/2023, which resulted in resection of terminal ileum stricture/ileocolic resection with Dr. Lyon 7/2023. She was initiated on IFX biosimilar postoperatively and has now completed 6 infusions. She denies side effects from receiving the infusions. She denies extraintestinal manifestations. She states that since surgery, she has had significant improvement in abdominal pain and is able to tolerate most foods without difficulty. Denies nausea and vomiting and bloating.   Cscope (4/11/24): The colon was unremarkable to the level of the ileocolonic anastomosis, minimal ulceration at staple line. NeoTI did not have any inflammation. path: left colon biopsy showing increased chronic inflammatory cells.   EGD (4/11/24): normal esophagus, localized granularity in stomach, normal duodenum. path: Gastric mucosa showing focal mild chronic active inflammation, Helicobacter pylori-like microorganisms present. No intestinal metaplasia or dysplasia identified.  7/5/23 - Laparoscopic Ileocolic Resection with Dr. Lyon Pathology: Ileal and cecal resection: Moderate to severely chronic active ileitis w/ focal ulceration and intramural abscess and moderate to severe colitis at the ICV Acute and chronic serositis and serosal fibrous adhesions Unremarkable appendix 2 hyperplastic region LNs No active disease seen at resection margins Negative for dysplasia

## 2025-05-22 NOTE — ASSESSMENT
[FreeTextEntry1] : 36-year-old female with strIcturing ileocolonic Crohn's Disease (currently on INFLECTRA, previously failed ADA and UST, previously screen failed for HAN, stricture/ileocolic resection 7/2023) -- currently in endoscopic remission 12/2024, eczema proven by biopsy worsened on IFX, now on Upadicitinib with resolution of eczema, stable clinical symptoms however does report some abd pain after meals.   #New onset abdominal pain with bulge - c/w Incisional hernia  - on physical exam at last visit, hernia present when patient performs sit-up - Dr. Lebron present briefly introduced to the patient; advised she undergo CT without contrast to identify hernia and plan for surgery - pt has underwent CT scan and has surgery scheduled in August   #Stricturing ileocolonic Crohn's disease - in endoscopic remission 12/10/2024 colon, previously on Inflectra however, pt was transitioned to UPA due to poorly controlled eczema that started around the time Inflectra started. This has significantly impacted her day to day life. Last dose Inflectra 12/5- since stopped with resolution of eczema - pt tolerating UPA well - will need cscope 6 mo into treatment (this summer) to asess mucosal response  - pain after meals - plan for for MRE to rule out small bowel inflam - extensive discussion regarding risk benefit of Skyrizi vs. Rinvoq at last visit - including the increased risk of DVT, PE, shingles, and CAD in Rinvoq pts. She has no plans for further pregnancies. Pt proceeded with Rinvoq, and she prefers this because it is PO. The risks and special considerations of the JAK Inhibitor medications were discussed with the patient. This includes common side effects (headache, upper respiratory infection, nasopharyngitis, acne, urinary tract infection, and elevated cholesterol), increased risk of serious infections (including TB and herpes zoster), increased risk of blood clots (DVT and PE), and cardiovascular events in patients over the age of 50 with at least 1 heart disease risk factor (especially tobacco use). To mitigate these risks, baseline CBC, CMP, lipid panel, Hepatitis B, and QuantiFERON TB studies will be obtained prior to initial medication administration and periodic labs for medication monitoring. We recommend appropriate immunization (influenza, pneumonia, respiratory syncytial virus, herpes zoster, hepatitis B). Patient instructed to avoid live vaccinations while on small molecule therapy and for 4 weeks after discontinuation. Patient understands this medication may be teratogenic and that contraception or abstinence is required while on a MIRIAM inhibitor. Patient understands to notify the IBD team of pregnancy or upcoming major surgical operations, as this may require medication adjustment and collaboration between treating physicians. Patient given instructions for obtaining detailed resources on the medication through the drug manufacturers website as well as through the [employer] - pt received 2nd shingrix vaccine at last clinic visit  - continue UPA   HCM - refused annual flu vaccine - UTD annual dermatology   f/u after MRE
